# Patient Record
Sex: FEMALE | Race: BLACK OR AFRICAN AMERICAN | NOT HISPANIC OR LATINO | Employment: FULL TIME | URBAN - METROPOLITAN AREA
[De-identification: names, ages, dates, MRNs, and addresses within clinical notes are randomized per-mention and may not be internally consistent; named-entity substitution may affect disease eponyms.]

---

## 2021-10-18 ENCOUNTER — OFFICE VISIT (OUTPATIENT)
Dept: UROGYNECOLOGY | Facility: CLINIC | Age: 71
End: 2021-10-18
Payer: MEDICARE

## 2021-10-18 VITALS
WEIGHT: 165 LBS | SYSTOLIC BLOOD PRESSURE: 122 MMHG | HEIGHT: 62 IN | BODY MASS INDEX: 30.36 KG/M2 | DIASTOLIC BLOOD PRESSURE: 78 MMHG

## 2021-10-18 DIAGNOSIS — N81.11 CYSTOCELE, MIDLINE: Primary | ICD-10-CM

## 2021-10-18 PROCEDURE — 1101F PT FALLS ASSESS-DOCD LE1/YR: CPT | Mod: CPTII,S$GLB,, | Performed by: OBSTETRICS & GYNECOLOGY

## 2021-10-18 PROCEDURE — 1126F PR PAIN SEVERITY QUANTIFIED, NO PAIN PRESENT: ICD-10-PCS | Mod: CPTII,S$GLB,, | Performed by: OBSTETRICS & GYNECOLOGY

## 2021-10-18 PROCEDURE — 1126F AMNT PAIN NOTED NONE PRSNT: CPT | Mod: CPTII,S$GLB,, | Performed by: OBSTETRICS & GYNECOLOGY

## 2021-10-18 PROCEDURE — 51701 PR INSERTION OF NON-INDWELLING BLADDER CATHETERIZATION FOR RESIDUAL UR: ICD-10-PCS | Mod: S$GLB,,, | Performed by: OBSTETRICS & GYNECOLOGY

## 2021-10-18 PROCEDURE — 1101F PR PT FALLS ASSESS DOC 0-1 FALLS W/OUT INJ PAST YR: ICD-10-PCS | Mod: CPTII,S$GLB,, | Performed by: OBSTETRICS & GYNECOLOGY

## 2021-10-18 PROCEDURE — 3078F PR MOST RECENT DIASTOLIC BLOOD PRESSURE < 80 MM HG: ICD-10-PCS | Mod: CPTII,S$GLB,, | Performed by: OBSTETRICS & GYNECOLOGY

## 2021-10-18 PROCEDURE — 3078F DIAST BP <80 MM HG: CPT | Mod: CPTII,S$GLB,, | Performed by: OBSTETRICS & GYNECOLOGY

## 2021-10-18 PROCEDURE — 3288F FALL RISK ASSESSMENT DOCD: CPT | Mod: CPTII,S$GLB,, | Performed by: OBSTETRICS & GYNECOLOGY

## 2021-10-18 PROCEDURE — 51701 INSERT BLADDER CATHETER: CPT | Mod: S$GLB,,, | Performed by: OBSTETRICS & GYNECOLOGY

## 2021-10-18 PROCEDURE — 99214 OFFICE O/P EST MOD 30 MIN: CPT | Mod: 25,S$GLB,, | Performed by: OBSTETRICS & GYNECOLOGY

## 2021-10-18 PROCEDURE — 99999 PR PBB SHADOW E&M-NEW PATIENT-LVL III: ICD-10-PCS | Mod: PBBFAC,,, | Performed by: OBSTETRICS & GYNECOLOGY

## 2021-10-18 PROCEDURE — 3074F PR MOST RECENT SYSTOLIC BLOOD PRESSURE < 130 MM HG: ICD-10-PCS | Mod: CPTII,S$GLB,, | Performed by: OBSTETRICS & GYNECOLOGY

## 2021-10-18 PROCEDURE — 99999 PR PBB SHADOW E&M-NEW PATIENT-LVL III: CPT | Mod: PBBFAC,,, | Performed by: OBSTETRICS & GYNECOLOGY

## 2021-10-18 PROCEDURE — 3008F BODY MASS INDEX DOCD: CPT | Mod: CPTII,S$GLB,, | Performed by: OBSTETRICS & GYNECOLOGY

## 2021-10-18 PROCEDURE — 3008F PR BODY MASS INDEX (BMI) DOCUMENTED: ICD-10-PCS | Mod: CPTII,S$GLB,, | Performed by: OBSTETRICS & GYNECOLOGY

## 2021-10-18 PROCEDURE — 1159F PR MEDICATION LIST DOCUMENTED IN MEDICAL RECORD: ICD-10-PCS | Mod: CPTII,S$GLB,, | Performed by: OBSTETRICS & GYNECOLOGY

## 2021-10-18 PROCEDURE — 3288F PR FALLS RISK ASSESSMENT DOCUMENTED: ICD-10-PCS | Mod: CPTII,S$GLB,, | Performed by: OBSTETRICS & GYNECOLOGY

## 2021-10-18 PROCEDURE — 99214 PR OFFICE/OUTPT VISIT, EST, LEVL IV, 30-39 MIN: ICD-10-PCS | Mod: 25,S$GLB,, | Performed by: OBSTETRICS & GYNECOLOGY

## 2021-10-18 PROCEDURE — 3074F SYST BP LT 130 MM HG: CPT | Mod: CPTII,S$GLB,, | Performed by: OBSTETRICS & GYNECOLOGY

## 2021-10-18 PROCEDURE — 1159F MED LIST DOCD IN RCRD: CPT | Mod: CPTII,S$GLB,, | Performed by: OBSTETRICS & GYNECOLOGY

## 2021-10-20 DIAGNOSIS — N81.11 CYSTOCELE, MIDLINE: Primary | ICD-10-CM

## 2021-10-20 RX ORDER — ESTRADIOL 0.1 MG/G
1 CREAM VAGINAL
Qty: 42 G | Refills: 11 | Status: SHIPPED | OUTPATIENT
Start: 2021-10-20 | End: 2022-10-20

## 2021-11-18 ENCOUNTER — OFFICE VISIT (OUTPATIENT)
Dept: UROGYNECOLOGY | Facility: CLINIC | Age: 71
End: 2021-11-18
Payer: MEDICARE

## 2021-11-18 VITALS — BODY MASS INDEX: 31.24 KG/M2 | HEIGHT: 62 IN | WEIGHT: 169.75 LBS

## 2021-11-18 DIAGNOSIS — N81.11 CYSTOCELE, MIDLINE: Primary | ICD-10-CM

## 2021-11-18 PROCEDURE — 99213 OFFICE O/P EST LOW 20 MIN: CPT | Mod: S$GLB,,, | Performed by: PHYSICIAN ASSISTANT

## 2021-11-18 PROCEDURE — 3288F PR FALLS RISK ASSESSMENT DOCUMENTED: ICD-10-PCS | Mod: CPTII,S$GLB,, | Performed by: PHYSICIAN ASSISTANT

## 2021-11-18 PROCEDURE — 1126F AMNT PAIN NOTED NONE PRSNT: CPT | Mod: CPTII,S$GLB,, | Performed by: PHYSICIAN ASSISTANT

## 2021-11-18 PROCEDURE — 3288F FALL RISK ASSESSMENT DOCD: CPT | Mod: CPTII,S$GLB,, | Performed by: PHYSICIAN ASSISTANT

## 2021-11-18 PROCEDURE — 1126F PR PAIN SEVERITY QUANTIFIED, NO PAIN PRESENT: ICD-10-PCS | Mod: CPTII,S$GLB,, | Performed by: PHYSICIAN ASSISTANT

## 2021-11-18 PROCEDURE — 1159F MED LIST DOCD IN RCRD: CPT | Mod: CPTII,S$GLB,, | Performed by: PHYSICIAN ASSISTANT

## 2021-11-18 PROCEDURE — 1159F PR MEDICATION LIST DOCUMENTED IN MEDICAL RECORD: ICD-10-PCS | Mod: CPTII,S$GLB,, | Performed by: PHYSICIAN ASSISTANT

## 2021-11-18 PROCEDURE — 1101F PR PT FALLS ASSESS DOC 0-1 FALLS W/OUT INJ PAST YR: ICD-10-PCS | Mod: CPTII,S$GLB,, | Performed by: PHYSICIAN ASSISTANT

## 2021-11-18 PROCEDURE — 99999 PR PBB SHADOW E&M-EST. PATIENT-LVL II: ICD-10-PCS | Mod: PBBFAC,,, | Performed by: PHYSICIAN ASSISTANT

## 2021-11-18 PROCEDURE — 99999 PR PBB SHADOW E&M-EST. PATIENT-LVL II: CPT | Mod: PBBFAC,,, | Performed by: PHYSICIAN ASSISTANT

## 2021-11-18 PROCEDURE — 3008F BODY MASS INDEX DOCD: CPT | Mod: CPTII,S$GLB,, | Performed by: PHYSICIAN ASSISTANT

## 2021-11-18 PROCEDURE — 1101F PT FALLS ASSESS-DOCD LE1/YR: CPT | Mod: CPTII,S$GLB,, | Performed by: PHYSICIAN ASSISTANT

## 2021-11-18 PROCEDURE — 3008F PR BODY MASS INDEX (BMI) DOCUMENTED: ICD-10-PCS | Mod: CPTII,S$GLB,, | Performed by: PHYSICIAN ASSISTANT

## 2021-11-18 PROCEDURE — 99213 PR OFFICE/OUTPT VISIT, EST, LEVL III, 20-29 MIN: ICD-10-PCS | Mod: S$GLB,,, | Performed by: PHYSICIAN ASSISTANT

## 2025-03-25 ENCOUNTER — TELEPHONE (OUTPATIENT)
Dept: PAIN MEDICINE | Facility: CLINIC | Age: 75
End: 2025-03-25
Payer: MEDICARE

## 2025-03-25 NOTE — TELEPHONE ENCOUNTER
----- Message from Don sent at 3/24/2025 11:43 AM CDT -----  Regarding: pt  Name of Who is Calling:Raegan What is the request in detail: Requesting referral status Can the clinic reply by [facility]: no What [facility] to [phone]

## 2025-03-25 NOTE — TELEPHONE ENCOUNTER
Staff contacted patient, patient does not have a referral order put in for pain management . Patient stated she will call back after she contact her doctor .

## 2025-04-09 ENCOUNTER — HOSPITAL ENCOUNTER (OUTPATIENT)
Dept: RADIOLOGY | Facility: OTHER | Age: 75
Discharge: HOME | End: 2025-04-09
Attending: STUDENT IN AN ORGANIZED HEALTH CARE EDUCATION/TRAINING PROGRAM
Payer: MEDICARE

## 2025-04-09 ENCOUNTER — OFFICE VISIT (OUTPATIENT)
Dept: PAIN MEDICINE | Facility: CLINIC | Age: 75
End: 2025-04-09
Payer: MEDICARE

## 2025-04-09 VITALS
TEMPERATURE: 99 F | HEART RATE: 55 BPM | OXYGEN SATURATION: 98 % | DIASTOLIC BLOOD PRESSURE: 64 MMHG | BODY MASS INDEX: 30.12 KG/M2 | RESPIRATION RATE: 18 BRPM | WEIGHT: 164.69 LBS | SYSTOLIC BLOOD PRESSURE: 118 MMHG

## 2025-04-09 DIAGNOSIS — M47.816 LUMBAR SPONDYLOSIS: ICD-10-CM

## 2025-04-09 DIAGNOSIS — M54.50 LOW BACK PAIN, UNSPECIFIED BACK PAIN LATERALITY, UNSPECIFIED CHRONICITY, UNSPECIFIED WHETHER SCIATICA PRESENT: ICD-10-CM

## 2025-04-09 DIAGNOSIS — G89.29 CHRONIC PAIN OF RIGHT ANKLE: ICD-10-CM

## 2025-04-09 DIAGNOSIS — M25.571 CHRONIC PAIN OF RIGHT ANKLE: ICD-10-CM

## 2025-04-09 DIAGNOSIS — M94.0 COSTOCHONDRITIS: Primary | ICD-10-CM

## 2025-04-09 PROCEDURE — 73610 X-RAY EXAM OF ANKLE: CPT | Mod: 26,RT,, | Performed by: RADIOLOGY

## 2025-04-09 PROCEDURE — 99999 PR PBB SHADOW E&M-EST. PATIENT-LVL III: CPT | Mod: PBBFAC,,, | Performed by: ANESTHESIOLOGY

## 2025-04-09 PROCEDURE — 72114 X-RAY EXAM L-S SPINE BENDING: CPT | Mod: TC,FY

## 2025-04-09 PROCEDURE — 3008F BODY MASS INDEX DOCD: CPT | Mod: CPTII,S$GLB,, | Performed by: ANESTHESIOLOGY

## 2025-04-09 PROCEDURE — 3078F DIAST BP <80 MM HG: CPT | Mod: CPTII,S$GLB,, | Performed by: ANESTHESIOLOGY

## 2025-04-09 PROCEDURE — 1159F MED LIST DOCD IN RCRD: CPT | Mod: CPTII,S$GLB,, | Performed by: ANESTHESIOLOGY

## 2025-04-09 PROCEDURE — 4010F ACE/ARB THERAPY RXD/TAKEN: CPT | Mod: CPTII,S$GLB,, | Performed by: ANESTHESIOLOGY

## 2025-04-09 PROCEDURE — 1101F PT FALLS ASSESS-DOCD LE1/YR: CPT | Mod: CPTII,S$GLB,, | Performed by: ANESTHESIOLOGY

## 2025-04-09 PROCEDURE — 99204 OFFICE O/P NEW MOD 45 MIN: CPT | Mod: S$GLB,,, | Performed by: ANESTHESIOLOGY

## 2025-04-09 PROCEDURE — 3074F SYST BP LT 130 MM HG: CPT | Mod: CPTII,S$GLB,, | Performed by: ANESTHESIOLOGY

## 2025-04-09 PROCEDURE — 72114 X-RAY EXAM L-S SPINE BENDING: CPT | Mod: 26,,, | Performed by: RADIOLOGY

## 2025-04-09 PROCEDURE — 73610 X-RAY EXAM OF ANKLE: CPT | Mod: TC,FY,RT

## 2025-04-09 PROCEDURE — 3288F FALL RISK ASSESSMENT DOCD: CPT | Mod: CPTII,S$GLB,, | Performed by: ANESTHESIOLOGY

## 2025-04-09 PROCEDURE — 1125F AMNT PAIN NOTED PAIN PRSNT: CPT | Mod: CPTII,S$GLB,, | Performed by: ANESTHESIOLOGY

## 2025-04-09 NOTE — PROGRESS NOTES
PCP: Esme Chavarria, NP    REFERRING PHYSICIAN: Blaise Dietz -    CHIEF COMPLAINT: Abdominal Pain    Original HISTORY OF PRESENT ILLNESS: Mariano Pan presents to the clinic for the evaluation of the above pain. The pain started 5 years without inciting event without inciting event    Original Pain Description:  Abdominal pain is by her upper abdomen.  Pain is constant and not worse after food or laying down at night.  Nothing makes it better except tramadol which she takes for her ankle pain.  She takes 1 tramadol daily which really helps the pain.      She also endorses buttock pain which has been going on for 10 years, worse with standing, doing chores, bending forward.  Better with sitting down.  Pain is achy and burning in bilateral low back and buttocks, does not travel past the knee.  Denies weakness, GI/ incontinence.  She cannot walk long distance but this is due to her ankle pain, which she had surgery on.        Original PAIN SCORES:  Best: Pain is 3  Worst: Pain is 8  Current: Pain is 5         No data to display                INTERVAL HISTORY: (Newest visit at the bottom)   Interval History (Date):       6 weeks of Conservative therapy:  PT: none  Chiro:  HEP:       Treatments / Medications: (Ice/Heat/NSAIDS/APAP/etc):  Tramadol (for her ankle pain)      Interventional Pain Procedures: (Previous injections)  None    Past Medical History:   Diagnosis Date    HTLV-type I     Hyperlipidemia     Hypertension     TB (tuberculosis)     trace    Thyroid disease      Past Surgical History:   Procedure Laterality Date    PARTIAL HYSTERECTOMY      THYROID SURGERY      goiter    TUMOR REMOVAL      right breast     Social History[1]  Family History   Problem Relation Name Age of Onset    Multiple sclerosis Mother      Heart failure Father      Cancer Sister      Cancer Unknown          brain       Review of patient's allergies indicates:   Allergen Reactions    Trental [pentoxifylline] Nausea  Only       Current Outpatient Medications   Medication Sig    amlodipine-olmesartan (WENDY) 10-40 mg per tablet Take 1 tablet by mouth once daily.    atorvastatin (LIPITOR) 20 MG tablet Take 20 mg by mouth once daily.    cilostazol (PLETAL) 100 MG Tab Take 1 tablet (100 mg total) by mouth 2 (two) times daily.    ergocalciferol (ERGOCALCIFEROL) 50,000 unit Cap Take 50,000 Units by mouth every 7 days.    estradioL (ESTRACE) 0.01 % (0.1 mg/gram) vaginal cream Place 1 g vaginally every Mon, Wed, Fri.    levothyroxine (SYNTHROID) 88 MCG tablet Take 88 mcg by mouth once daily.     No current facility-administered medications for this visit.       ROS:  GENERAL: No fever. No chills. No fatigue. Denies weight loss. Denies weight gain.  HEENT: Denies headaches. Denies vision change. Denies eye pain. Denies double vision. Denies ear pain.   CV: Denies chest pain.   PULM: Denies of shortness of breath.  GI: Denies constipation. No diarrhea. No abdominal pain. Denies nausea. Denies vomiting. No blood in stool.  HEME: Denies bleeding problems.  : Denies urgency. No painful urination. No blood in urine.  MS: Denies joint stiffness. Denies joint swelling.  Denies back pain.  SKIN: Denies rash.   NEURO: Denies seizures. No weakness.  PSYCH:  Denies difficulty sleeping. No anxiety. Denies depression. No suicidal thoughts.       VITALS:   Vitals:    04/09/25 0953   BP: 118/64   Pulse: (!) 55   Resp: 18   Temp: 98.8 °F (37.1 °C)   SpO2: 98%   Weight: 74.7 kg (164 lb 10.9 oz)   PainSc:   7         PHYSICAL EXAM:   GENERAL: Well appearing, in no acute distress, alert and oriented x3.  PSYCH:  Mood and affect appropriate.  SKIN: Skin color, texture, turgor normal, no rashes or lesions.  HEENT:  Normocephalic, atraumatic. Cranial nerves grossly intact.  PULM: No evidence of respiratory difficulty, symmetric chest rise.  GI:  Non-distended.    BACK: Normal range of motion. No pain to palpation over the spinous processes. No pain to  "palpation over facet joints. There is no pain with palpation over the sacroiliac joints bilaterally or GTB, Piriformis bilaterally.  Facet loading is negative.  Milgram's negative.  KYMBERLY and FADIR, ASIS compression, Thigh thrust negative bilaterally.    EXTREMITIES: No deformities, edema, or skin discoloration.   MUSCULOSKELETAL: No atrophy is noted.  NEURO: Sensation is equal and appropriate bilaterally. Bilateral upper and lower extremity strength is normal and symmetric. Bilateral upper and lower extremity coordination and muscle stretch reflexes are physiologic and symmetric. Plantar response are downgoing. Straight leg raising in the supine position is negative to radicular pain.   GAIT: normal.      LABS:  LABS    BMP  Lab Results   Component Value Date     01/25/2023    K 3.6 01/25/2023    CO2 27 01/25/2023    BUN 19.0 01/25/2023    CREATININE 0.99 01/25/2023    CALCIUM 8.4 01/25/2023    EGFRNORACEVR 61 (L) 01/25/2023     LFTS  Lab Results   Component Value Date    ALT 12 01/25/2023    AST 24 01/25/2023    BILITOT 0.3 01/25/2023     HGBA1c  No results found for: "LABA1C", "HGBA1C"      IMAGING:    MRI Lumbar Spine Without Contrast  Order: [id number]  Impression    1. Incidental note is made of a horseshoe kidney.  2. Multilevel disc space narrowing, desiccation of all of the lumbar disks, multilevel facet joint arthropathy, multilevel ligament flavum redundancy and multilevel central canal stenosis as detailed above.  3. Also as detailed above, there are posteriorly protruding discs at every level in the lumbar spine except L1-L2.    Electronically Signed By: Hair Alba MD 1/25/2021 10:32 AM CST  Narrative      MRI OF THE LUMBAR SPINE WITHOUT I.V. CONTRAST:    CPT CODE: 34026    INDICATION: lOW BACK PAIN    COMPARISON STUDY: None     TECHNIQUE: This study was performed on the [facility] [employer] 1.5 Janice high field MR unit. Multislice, multisequence images of the lumbar spine were obtained in multiple projections " without the use of I.V. contrast.    FINDINGS: Lumbar vertebral body height and alignment are maintained. There is a small hemangioma in L2. Otherwise, the signal from the lumbar vertebra is normal. All of the lumbar discs are desiccated with mild narrowing of the L3-4 disc space and moderate narrowing of the L2-3 disc space.    L5-S1 level: There is bilateral facet joint arthropathy and ligamentum flavum redundancy helping to create mild central canal stenosis but without bony foraminal narrowing. There is a slight bilobed posterior protrusion of the disc along the anteromedial foramen bilaterally, slightly worse on the right.    L4-L5 level: There is bilateral facet joint arthropathy and ligamentum flavum redundancy helping to create mild to moderate central canal stenosis without bony foraminal narrowing. There is a mild to moderate broad-based posterior protrusion of the disc that extends well into the left-sided foramen and possibly into and through the right-sided foramen.    L3-L4 level: There is bilateral facet joint arthropathy and ligament flavum redundancy helping to create slight central canal stenosis. There is no bony foraminal narrowing. There is mild bilateral bilobed posterior protrusion of the disc at the level of the anteromedial foramen bilaterally.    L2-L3 level: There is bilateral facet joint arthropathy and ligament flavum redundancy helping to create slight central canal stenosis with slight foraminal narrowing on the left. There is a slight protrusion of the disc posterolaterally to the right and a mild to moderate protrusion of the disc posteriorly in the midline and to the left of midline, extending superiorly and inferiorly beneath the posterior longitudinal ligament.    At the T12-L1 and L1-L2 levels, there is no bony foraminal narrowing or bony central canal stenosis and the posterior disc margin is unremarkable.    The tip of the conus medullaris extends down to level of the mid body  of L1 and the signal from visualized conus is unremarkable. On the axial images, the paravertebral soft tissues are unremarkable. There is incidental note made of a horseshoe kidney.  Exam End: 01/25/21 10:18    Specimen Collected: 01/25/21 10:26 Last Resulted: 01/25/21 10:32   Received From: [facility]  Result Received: 04/09/25 09:40       CT Abdomen Pelvis With IV Contrast  Order: [id number]  Impression      IMPRESSION:    No CT evidence of acute pathology involving the abdomen or pelvis.    Horseshoe kidney.    Aortic atherosclerosis and other incidental findings as above.    Electronically Signed By: Hemant Carrion 12/21/2022 2:42 PM CST  Narrative    CMS MANDATED QUALITY DATA - CT RADIATION - 436    All CT scans at this facility utilize dose modulation, iterative reconstruction, and/or weight based dosing when appropriate to reduce radiation dose to as low as reasonably achievable.    [facility] CT ABDOMEN PELVIS WITH CONTRAST    Indication:72 years DviybmB62.84Dyspepsia due to dysmotility [K30 (ICD-10-CM)]; Abdominal pain, generalized [R10.84 (ICD-10-CM)]; Rectal bleeding [K62.5    Comparison: None    Findings: Lung bases are clear. Bone window images show no acute or aggressive osseous abnormality.    Tiny subcentimeter hypodensities involving lateral segment left hepatic lobe are too small to characterize, most likely a cyst. No other hepatic abnormality. Gallbladder and biliary tree are unremarkable. Spleen and pancreas are unremarkable. Mild bilateral and symmetric adrenal gland thickening. Horseshoe kidney. No nephrolithiasis or hydronephrosis. Delayed images demonstrate normal contrast excretion into the collecting systems bilaterally. Urinary bladder is unremarkable.    No lesion of the gastric wall is evident. There is mixed attenuation within the gastric lumen which could reflect ingested food and contrast. No evidence of small bowel obstruction. Normal appendix. No evidence of colitis.    No free fluid or  free air within the abdomen or pelvis. Aortoiliac atherosclerotic calcification. Uterus is surgically absent. The right ovary appears asymmetrically enlarged relative to the left.  Exam End: 12/21/22 14:26    Specimen Collected: 12/21/22 14:35 Last Resulted: 12/21/22 14:42   Received From: [facility]  Result Received: 04/09/25 09:40       ASSESSMENT: 74 y.o. year old female with pain, consistent with:    Encounter Diagnoses   Name Primary?    Low back pain, unspecified back pain laterality, unspecified chronicity, unspecified whether sciatica present     Chronic pain of right ankle     Costochondritis Yes       DISCUSSION: Mariano Pan is a nice woman who comes to us from [facility] with upper abdominal pain that is constant and not aggravated by food or laying down at night.  She has had a normal GI workup. CT abdomen showed no abnormality gallbladder, spleen, pancreas. Exam shows pain reproduced with palpation of costochondral junction bilaterally.  She also reports axial low back/buttock pain worse with standing, bending forward when doing chores.      OPIOID MANAGEMENT:  MME: 5 per ortho  Risk:   : Reviewed today  Naloxone:   Utox:   Violations: None  Contract:      PLAN:  Will schedule patient for steroid injection of bilateral costochondral junction under Ultrasound  Patient will be discharged from Dr. Griggs, who has been managing her ankle pain and tramadol prescription.  We will discuss on follow up after ultrasound injection whether tramadol should be continued.  Obtain Xray of her right ankle and lumbar spine  If back pain persist, will start her on physical therapy  Follow up in office for above injection  Will discuss Xrays and Tramadol on follow up after procedure      I would like to thank [facility] - for the opportunity to assist in the care of this patient. We had a very nice visit and I look forward to continuing their care. Please let me know if I can be of further assistance.      Arlen Mooney  04/09/2025         [1]   Social History  Socioeconomic History    Marital status: Single   Tobacco Use    Smoking status: Every Day    Smokeless tobacco: Never   Substance and Sexual Activity    Alcohol use: No    Sexual activity: Not Currently

## 2025-04-23 DIAGNOSIS — M94.0 COSTOCHONDRITIS: Primary | ICD-10-CM

## 2025-05-05 ENCOUNTER — TELEPHONE (OUTPATIENT)
Dept: PAIN MEDICINE | Facility: CLINIC | Age: 75
End: 2025-05-05
Payer: MEDICARE

## 2025-05-05 NOTE — TELEPHONE ENCOUNTER
"Left a voicemail, confirming appointment location & time on 05/07/25 with Dr. Mooney.    Glacier Bayt message sent;  "    Ms. Pan,  Good morning. I left a voicemail earlier today, reminding you of your appointment with Dr. Mooney  on Wednesday, May 7, 2025 at 2:45 pm.       We are located at:  [facility]  9th Floor, [address]  [place] [address]     See you then!     Thank you,  CHARLETTE Strong LPN   "  "

## 2025-05-07 ENCOUNTER — TELEPHONE (OUTPATIENT)
Dept: PAIN MEDICINE | Facility: CLINIC | Age: 75
End: 2025-05-07
Payer: MEDICARE

## 2025-05-07 NOTE — TELEPHONE ENCOUNTER
----- Message from Ajay sent at 5/7/2025  9:19 AM CDT -----  Type: Patient CallWho Called: Patient Does the patient know what this is regarding? Pt is requesting a call back to reschedule her appt on today, 5/7 due to the weather.  Please advise Does the patient rather a call back or a response via [facility]? callBest Call Back Number: [phone] Additional Information:

## 2025-05-21 ENCOUNTER — OFFICE VISIT (OUTPATIENT)
Dept: PAIN MEDICINE | Facility: CLINIC | Age: 75
End: 2025-05-21
Payer: MEDICARE

## 2025-05-21 VITALS
OXYGEN SATURATION: 98 % | WEIGHT: 174.38 LBS | RESPIRATION RATE: 14 BRPM | HEART RATE: 61 BPM | SYSTOLIC BLOOD PRESSURE: 111 MMHG | DIASTOLIC BLOOD PRESSURE: 71 MMHG | BODY MASS INDEX: 32.09 KG/M2 | HEIGHT: 62 IN | TEMPERATURE: 99 F

## 2025-05-21 DIAGNOSIS — M79.18 BILATERAL BUTTOCK PAIN: Primary | ICD-10-CM

## 2025-05-21 PROCEDURE — 4010F ACE/ARB THERAPY RXD/TAKEN: CPT | Mod: CPTII,S$GLB,, | Performed by: ANESTHESIOLOGY

## 2025-05-21 PROCEDURE — 3288F FALL RISK ASSESSMENT DOCD: CPT | Mod: CPTII,S$GLB,, | Performed by: ANESTHESIOLOGY

## 2025-05-21 PROCEDURE — 99999 PR PBB SHADOW E&M-EST. PATIENT-LVL IV: CPT | Mod: PBBFAC,,, | Performed by: ANESTHESIOLOGY

## 2025-05-21 PROCEDURE — 3078F DIAST BP <80 MM HG: CPT | Mod: CPTII,S$GLB,, | Performed by: ANESTHESIOLOGY

## 2025-05-21 PROCEDURE — 1159F MED LIST DOCD IN RCRD: CPT | Mod: CPTII,S$GLB,, | Performed by: ANESTHESIOLOGY

## 2025-05-21 PROCEDURE — 99214 OFFICE O/P EST MOD 30 MIN: CPT | Mod: S$GLB,,, | Performed by: ANESTHESIOLOGY

## 2025-05-21 PROCEDURE — 3074F SYST BP LT 130 MM HG: CPT | Mod: CPTII,S$GLB,, | Performed by: ANESTHESIOLOGY

## 2025-05-21 PROCEDURE — 1126F AMNT PAIN NOTED NONE PRSNT: CPT | Mod: CPTII,S$GLB,, | Performed by: ANESTHESIOLOGY

## 2025-05-21 PROCEDURE — 1101F PT FALLS ASSESS-DOCD LE1/YR: CPT | Mod: CPTII,S$GLB,, | Performed by: ANESTHESIOLOGY

## 2025-05-21 PROCEDURE — 3008F BODY MASS INDEX DOCD: CPT | Mod: CPTII,S$GLB,, | Performed by: ANESTHESIOLOGY

## 2025-05-21 RX ORDER — ASPIRIN 81 MG/1
81 TABLET ORAL
COMMUNITY

## 2025-05-21 RX ORDER — CLOPIDOGREL BISULFATE 75 MG/1
75 TABLET ORAL
COMMUNITY
Start: 2025-04-24

## 2025-05-21 RX ORDER — ROSUVASTATIN CALCIUM 40 MG/1
40 TABLET, COATED ORAL
COMMUNITY

## 2025-05-21 RX ORDER — TRAMADOL HYDROCHLORIDE 50 MG/1
50 TABLET, FILM COATED ORAL EVERY 6 HOURS PRN
COMMUNITY
Start: 2025-03-25 | End: 2025-06-06

## 2025-05-21 RX ORDER — LISINOPRIL AND HYDROCHLOROTHIAZIDE 12.5; 2 MG/1; MG/1
1 TABLET ORAL
COMMUNITY
Start: 2025-04-24

## 2025-05-21 RX ORDER — LEVOTHYROXINE SODIUM 75 UG/1
75 TABLET ORAL
COMMUNITY

## 2025-05-21 NOTE — PROGRESS NOTES
PCP: Esme Chavarria NP    REFERRING PHYSICIAN: No ref. provider found    CHIEF COMPLAINT: Abdominal Pain    Original HISTORY OF PRESENT ILLNESS: Mariano Pan presents to the clinic for the evaluation of the above pain. The pain started 5 years without inciting event without inciting event    Original Pain Description:  Abdominal pain is by her upper abdomen.  Pain is constant and not worse after food or laying down at night.  Nothing makes it better except tramadol which she takes for her ankle pain.  She takes 1 tramadol daily which really helps the pain.      She also endorses buttock pain which has been going on for 10 years, worse with standing, doing chores, bending forward.  Better with sitting down.  Pain is achy and burning in bilateral low back and buttocks, does not travel past the knee.  Denies weakness, GI/ incontinence.  She cannot walk long distance but this is due to her ankle pain, which she had surgery on.        Original PAIN SCORES:  Best: Pain is 3  Worst: Pain is 8  Current: Pain is 5         No data to display                INTERVAL HISTORY: (Newest visit at the bottom)   Interval History (Date):     Interval History 05/21/25  Mariano is a 74-year-old female with a known history of costochondritis, currently reporting significant improvement in her chest wall pain. At her last visit, steroid injections were recommended, but she now rates her pain as 0/10, with discomfort only elicited on palpation.  She also reports ongoing lower back pain, for which lumbar X-rays were obtained at her previous visit. Imaging revealed degenerative changes at the L5/S1 level. She describes the lower back pain as intermittent and burning in nature, rated 8/10 at its worst, with bilateral radiation. The pain is aggravated by standing and activities such as mopping, and relieved by sitting. Walking does not exacerbate the discomfort. The patient is interested in exploring treatment options for this  issue. She has no additional questions or concerns at this time.    6 weeks of Conservative therapy:  PT: none (Scheduled today)  Chiro: none  HEP: none      Treatments / Medications: (Ice/Heat/NSAIDS/APAP/etc):  Tramadol 50 mg q8 (for her ankle pain)      Interventional Pain Procedures: (Previous injections)  None    Past Medical History:   Diagnosis Date    HTLV-type I     Hyperlipidemia     Hypertension     TB (tuberculosis)     trace    Thyroid disease      Past Surgical History:   Procedure Laterality Date    PARTIAL HYSTERECTOMY      THYROID SURGERY      goiter    TUMOR REMOVAL      right breast     Social History[1]  Family History   Problem Relation Name Age of Onset    Multiple sclerosis Mother      Heart failure Father      Cancer Sister      Cancer Unknown          brain       Review of patient's allergies indicates:   Allergen Reactions    Trental [pentoxifylline] Nausea Only       Current Outpatient Medications   Medication Sig    amlodipine-olmesartan (WENDY) 10-40 mg per tablet Take 1 tablet by mouth once daily.    atorvastatin (LIPITOR) 20 MG tablet Take 20 mg by mouth once daily.    cilostazol (PLETAL) 100 MG Tab Take 1 tablet (100 mg total) by mouth 2 (two) times daily.    ergocalciferol (ERGOCALCIFEROL) 50,000 unit Cap Take 50,000 Units by mouth every 7 days.    estradioL (ESTRACE) 0.01 % (0.1 mg/gram) vaginal cream Place 1 g vaginally every Mon, Wed, Fri.    levothyroxine (SYNTHROID) 88 MCG tablet Take 88 mcg by mouth once daily.     No current facility-administered medications for this visit.       ROS:  GENERAL: No fever. No chills. No fatigue. Denies weight loss. Denies weight gain.  HEENT: Denies headaches. Denies vision change. Denies eye pain. Denies double vision. Denies ear pain.   CV: Denies chest pain.   PULM: Denies of shortness of breath.  GI: Denies constipation. No diarrhea. No abdominal pain. Denies nausea. Denies vomiting. No blood in stool.  HEME: Denies bleeding problems.  :  Denies urgency. No painful urination. No blood in urine.  MS: Denies joint stiffness. Denies joint swelling.  Denies back pain.  SKIN: Denies rash.   NEURO: Denies seizures. No weakness.  PSYCH:  Denies difficulty sleeping. No anxiety. Denies depression. No suicidal thoughts.       VITALS:   There were no vitals filed for this visit.        PHYSICAL EXAM:   GENERAL: Well appearing, in no acute distress, alert and oriented x3.  PSYCH:  Mood and affect appropriate.  SKIN: Skin color, texture, turgor normal, no rashes or lesions.  HEENT:  Normocephalic, atraumatic. Cranial nerves grossly intact.  PULM: No evidence of respiratory difficulty, symmetric chest rise.  GI:  Non-distended.    BACK: Normal range of motion. No pain to palpation over the spinous processes. No pain to palpation over facet joints. There is no pain with palpation over the sacroiliac joints bilaterally or GTB, Piriformis bilaterally.  Facet loading is negative.  Milgram's negative.  LIZZETH and FADIR, ASIS compression, Thigh thrust negative bilaterally.    EXTREMITIES: No deformities, edema, or skin discoloration.   MUSCULOSKELETAL: No atrophy is noted. Migram (negative), SLR (negative), Lizzeth test (negative), Pelvic compression test (negative), Good ROM of both legs bilaterally. Knee flexion limited on right due to right ankle surgery.   NEURO: Sensation is equal and appropriate bilaterally. Bilateral upper and lower extremity strength is normal and symmetric. Bilateral upper and lower extremity coordination and muscle stretch reflexes are physiologic and symmetric. Plantar response are downgoing. Straight leg raising in the supine position is negative to radicular pain.   GAIT: normal with mild limitation in ankle flexion.      LABS:  LABS    BMP  Lab Results   Component Value Date     01/25/2023    K 3.6 01/25/2023    CO2 27 01/25/2023    BUN 19.0 01/25/2023    CREATININE 0.99 01/25/2023    CALCIUM 8.4 01/25/2023    EGFRNORACEVR 61 (L)  "01/25/2023     LFTS  Lab Results   Component Value Date    ALT 12 01/25/2023    AST 24 01/25/2023    BILITOT 0.3 01/25/2023     HGBA1c  No results found for: "LABA1C", "HGBA1C"      IMAGING:   Xray of Right Ankle 04/25/25   FINDINGS:     Diffuse osseous demineralization. Intact and unchanged bimalleolar and syndesmotic plate and screw fixation.   The ankle joint and ankle mortise is maintained. Achilles and plantar calcaneal enthesopathy.   Multifocal vascular calcifications. Persistent soft tissue swelling around ankle.     Xray of Lumbar Spine 04/09/25  FINDINGS:  Mild dextroscoliosis centered at L2-L3.  AP alignment is maintained.  No instability.  No acute fracture.  Intervertebral disc height loss most notable L5-S1.  Facet arthropathy worse at inferior lumbar levels.     Impression:     Degenerative change most notable at L5-S1 without instability.     MRI Lumbar Spine Without Contrast  Order: [id number]  Impression    1. Incidental note is made of a horseshoe kidney.  2. Multilevel disc space narrowing, desiccation of all of the lumbar disks, multilevel facet joint arthropathy, multilevel ligament flavum redundancy and multilevel central canal stenosis as detailed above.  3. Also as detailed above, there are posteriorly protruding discs at every level in the lumbar spine except L1-L2.    Electronically Signed By: Hair Alba MD 1/25/2021 10:32 AM CST  Narrative      MRI OF THE LUMBAR SPINE WITHOUT I.V. CONTRAST:    CPT CODE: 74392    INDICATION: lOW BACK PAIN    COMPARISON STUDY: None     TECHNIQUE: This study was performed on the [employer] 1.5 Janice high field MR unit. Multislice, multisequence images of the lumbar spine were obtained in multiple projections without the use of I.V. contrast.    FINDINGS: Lumbar vertebral body height and alignment are maintained. There is a small hemangioma in L2. Otherwise, the signal from the lumbar vertebra is normal. All of the lumbar discs are desiccated with mild narrowing " of the L3-4 disc space and moderate narrowing of the L2-3 disc space.    L5-S1 level: There is bilateral facet joint arthropathy and ligamentum flavum redundancy helping to create mild central canal stenosis but without bony foraminal narrowing. There is a slight bilobed posterior protrusion of the disc along the anteromedial foramen bilaterally, slightly worse on the right.    L4-L5 level: There is bilateral facet joint arthropathy and ligamentum flavum redundancy helping to create mild to moderate central canal stenosis without bony foraminal narrowing. There is a mild to moderate broad-based posterior protrusion of the disc that extends well into the left-sided foramen and possibly into and through the right-sided foramen.    L3-L4 level: There is bilateral facet joint arthropathy and ligament flavum redundancy helping to create slight central canal stenosis. There is no bony foraminal narrowing. There is mild bilateral bilobed posterior protrusion of the disc at the level of the anteromedial foramen bilaterally.    L2-L3 level: There is bilateral facet joint arthropathy and ligament flavum redundancy helping to create slight central canal stenosis with slight foraminal narrowing on the left. There is a slight protrusion of the disc posterolaterally to the right and a mild to moderate protrusion of the disc posteriorly in the midline and to the left of midline, extending superiorly and inferiorly beneath the posterior longitudinal ligament.    At the T12-L1 and L1-L2 levels, there is no bony foraminal narrowing or bony central canal stenosis and the posterior disc margin is unremarkable.    The tip of the conus medullaris extends down to level of the mid body of L1 and the signal from visualized conus is unremarkable. On the axial images, the paravertebral soft tissues are unremarkable. There is incidental note made of a horseshoe kidney.  Exam End: 01/25/21 10:18    Specimen Collected: 01/25/21 10:26 Last  Resulted: 01/25/21 10:32   Received From: [facility]  Result Received: 04/09/25 09:40       CT Abdomen Pelvis With IV Contrast  Order: [id number]  Impression      IMPRESSION:    No CT evidence of acute pathology involving the abdomen or pelvis.    Horseshoe kidney.    Aortic atherosclerosis and other incidental findings as above.    Electronically Signed By: Hemant Carrion 12/21/2022 2:42 PM CST  Narrative    CMS MANDATED QUALITY DATA - CT RADIATION - 436    All CT scans at this facility utilize dose modulation, iterative reconstruction, and/or weight based dosing when appropriate to reduce radiation dose to as low as reasonably achievable.    [facility] ABDOMEN PELVIS WITH CONTRAST    Indication:72 years [id number] due to dysmotility [K30 (ICD-10-CM)]; Abdominal pain, generalized [R10.84 (ICD-10-CM)]; Rectal bleeding [K62.5    Comparison: None    Findings: Lung bases are clear. Bone window images show no acute or aggressive osseous abnormality.    Tiny subcentimeter hypodensities involving lateral segment left hepatic lobe are too small to characterize, most likely a cyst. No other hepatic abnormality. Gallbladder and biliary tree are unremarkable. Spleen and pancreas are unremarkable. Mild bilateral and symmetric adrenal gland thickening. Horseshoe kidney. No nephrolithiasis or hydronephrosis. Delayed images demonstrate normal contrast excretion into the collecting systems bilaterally. Urinary bladder is unremarkable.    No lesion of the gastric wall is evident. There is mixed attenuation within the gastric lumen which could reflect ingested food and contrast. No evidence of small bowel obstruction. Normal appendix. No evidence of colitis.    No free fluid or free air within the abdomen or pelvis. Aortoiliac atherosclerotic calcification. Uterus is surgically absent. The right ovary appears asymmetrically enlarged relative to the left.  Exam End: 12/21/22 14:26    Specimen Collected: 12/21/22 14:35 Last  Resulted: 12/21/22 14:42   Received From: [facility]  Result Received: 04/09/25 09:40       ASSESSMENT: 74 y.o. year old female with pain, consistent with bilateral buttocks pain:    Encounter Diagnosis   Name Primary?    Bilateral buttock pain Yes       DISCUSSION: Mariano Pan is a nice woman who originally came to us from [facility] with upper abdominal pain that was reproduced with palpation of costochondral junction bilaterally. She returned with this pain resolved but a new complaint of bilateral buttock pain worse with standing, bending forward when doing chores.      OPIOID MANAGEMENT:  MME: Tramadol 50 QD per ortho for ankle    PLAN:  Reviewed Xrays  Will refer to PT  Follow up in 2 months  If pain persists will consider advanced imaging       Silvia Garza, RYAN  05/21/2025         [1]   Social History  Socioeconomic History    Marital status: Single   Tobacco Use    Smoking status: Every Day    Smokeless tobacco: Never   Substance and Sexual Activity    Alcohol use: No    Sexual activity: Not Currently

## 2025-07-01 ENCOUNTER — TELEPHONE (OUTPATIENT)
Dept: PAIN MEDICINE | Facility: CLINIC | Age: 75
End: 2025-07-01
Payer: MEDICARE